# Patient Record
Sex: MALE | Race: WHITE | Employment: OTHER | URBAN - METROPOLITAN AREA
[De-identification: names, ages, dates, MRNs, and addresses within clinical notes are randomized per-mention and may not be internally consistent; named-entity substitution may affect disease eponyms.]

---

## 2017-07-22 PROBLEM — K57.32 DIVERTICULITIS OF CECUM: Status: ACTIVE | Noted: 2017-07-22

## 2018-02-09 PROBLEM — K82.1 HYDROPS OF GALLBLADDER: Status: ACTIVE | Noted: 2018-02-09

## 2018-10-16 ENCOUNTER — TELEPHONE (OUTPATIENT)
Dept: CARDIOTHORACIC SURGERY | Age: 58
End: 2018-10-16

## 2018-10-22 NOTE — TELEPHONE ENCOUNTER
Shanice Telles- with PCP dr. Sabrina Lawson called to refer patient over to Dr. Yohan Camp for ascending aortic anerurysm. We need to have the last office notes, film and when patient got CT SCAN done and the report and the insurance referral to see us  before I can se tup consult. She will get with that patient and his insruance to get this information and call me back on this.

## 2019-02-28 PROBLEM — R07.9 CHEST PAIN: Status: ACTIVE | Noted: 2019-02-28

## 2024-01-23 ENCOUNTER — PRE-OP/H&P (OUTPATIENT)
Dept: URBAN - METROPOLITAN AREA CLINIC 1 | Facility: CLINIC | Age: 64
End: 2024-01-23

## 2024-01-23 VITALS
BODY MASS INDEX: 30.8 KG/M2 | HEART RATE: 70 BPM | HEIGHT: 71 IN | WEIGHT: 220 LBS | SYSTOLIC BLOOD PRESSURE: 135 MMHG | DIASTOLIC BLOOD PRESSURE: 72 MMHG

## 2024-01-23 DIAGNOSIS — H25.813: ICD-10-CM

## 2024-01-23 PROCEDURE — 99499 UNLISTED E&M SERVICE: CPT

## 2024-01-23 PROCEDURE — 92025 CPTRIZED CORNEAL TOPOGRAPHY: CPT

## 2024-01-23 PROCEDURE — 92015 DETERMINE REFRACTIVE STATE: CPT

## 2024-01-23 PROCEDURE — 92136 OPHTHALMIC BIOMETRY: CPT

## 2024-01-23 ASSESSMENT — VISUAL ACUITY
OD_BAT: 20/400
OD_SC: 20/200

## 2024-01-23 ASSESSMENT — TONOMETRY
OS_IOP_MMHG: 10
OD_IOP_MMHG: 10

## 2024-01-31 ENCOUNTER — SURGERY/PROCEDURE (OUTPATIENT)
Dept: URBAN - METROPOLITAN AREA SURGERY 1 | Facility: SURGERY | Age: 64
End: 2024-01-31

## 2024-01-31 DIAGNOSIS — H25.811: ICD-10-CM

## 2024-01-31 PROCEDURE — 66984 XCAPSL CTRC RMVL W/O ECP: CPT

## 2024-01-31 PROCEDURE — 68841 INSJ RX ELUT IMPLT LAC CANAL: CPT

## 2024-02-01 ENCOUNTER — POST-OP (OUTPATIENT)
Dept: URBAN - METROPOLITAN AREA CLINIC 2 | Facility: CLINIC | Age: 64
End: 2024-02-01

## 2024-02-01 DIAGNOSIS — Z96.1: ICD-10-CM

## 2024-02-01 PROCEDURE — 99024 POSTOP FOLLOW-UP VISIT: CPT

## 2024-02-01 ASSESSMENT — TONOMETRY: OD_IOP_MMHG: 28

## 2024-02-01 ASSESSMENT — VISUAL ACUITY: OD_SC: 20/20

## 2024-02-06 ENCOUNTER — POST OP/EVAL OF SECOND EYE (OUTPATIENT)
Dept: URBAN - METROPOLITAN AREA CLINIC 1 | Facility: CLINIC | Age: 64
End: 2024-02-06

## 2024-02-06 VITALS
SYSTOLIC BLOOD PRESSURE: 137 MMHG | HEART RATE: 60 BPM | HEIGHT: 71 IN | WEIGHT: 220 LBS | DIASTOLIC BLOOD PRESSURE: 70 MMHG | BODY MASS INDEX: 30.8 KG/M2

## 2024-02-06 DIAGNOSIS — Z96.1: ICD-10-CM

## 2024-02-06 DIAGNOSIS — H25.812: ICD-10-CM

## 2024-02-06 PROCEDURE — 92136 OPHTHALMIC BIOMETRY: CPT

## 2024-02-06 PROCEDURE — 92025 CPTRIZED CORNEAL TOPOGRAPHY: CPT

## 2024-02-06 ASSESSMENT — TONOMETRY: OD_IOP_MMHG: 14

## 2024-02-06 ASSESSMENT — VISUAL ACUITY
OD_SC: 20/30
OD_SC: J16

## 2024-02-14 ENCOUNTER — SURGERY/PROCEDURE (OUTPATIENT)
Dept: URBAN - METROPOLITAN AREA SURGERY 1 | Facility: SURGERY | Age: 64
End: 2024-02-14

## 2024-02-14 DIAGNOSIS — H25.812: ICD-10-CM

## 2024-02-14 PROCEDURE — 66984 XCAPSL CTRC RMVL W/O ECP: CPT

## 2024-02-14 PROCEDURE — 68841 INSJ RX ELUT IMPLT LAC CANAL: CPT

## 2024-02-15 ENCOUNTER — POST-OP (OUTPATIENT)
Dept: URBAN - METROPOLITAN AREA CLINIC 1 | Facility: CLINIC | Age: 64
End: 2024-02-15

## 2024-02-15 DIAGNOSIS — Z96.1: ICD-10-CM

## 2024-02-15 ASSESSMENT — TONOMETRY
OD_IOP_MMHG: 10
OS_IOP_MMHG: 14

## 2024-02-15 ASSESSMENT — VISUAL ACUITY
OS_SC: 20/20
OD_SC: 20/20

## 2024-04-22 ENCOUNTER — POST-OP (OUTPATIENT)
Dept: URBAN - METROPOLITAN AREA CLINIC 1 | Facility: CLINIC | Age: 64
End: 2024-04-22

## 2024-04-22 DIAGNOSIS — Z96.1: ICD-10-CM

## 2024-04-22 ASSESSMENT — TONOMETRY
OS_IOP_MMHG: 11
OD_IOP_MMHG: 13

## 2024-04-22 ASSESSMENT — VISUAL ACUITY
OS_SC: 20/20
OS_SC: J5
OD_SC: 20/30-2
OD_SC: J5